# Patient Record
(demographics unavailable — no encounter records)

---

## 2024-10-19 NOTE — HISTORY OF PRESENT ILLNESS
[de-identified] : walk in- add on sibling visit [FreeTextEntry6] : Parent requests ear check- noticed wax in canal- wants to make sure no ear infection, no congestion or cough, no ST, no ear pain, no n/v/c/d, eating and drinking well, normal voiding. afebrile.

## 2024-10-19 NOTE — DISCUSSION/SUMMARY
[FreeTextEntry1] : D/W caregiver cerumen removal home care- benign ear exam today- advise debrox drops OTC for ear wax removal, avoid qtips; call if continued concerns. time spent: 20min

## 2024-12-03 NOTE — REVIEW OF SYSTEMS
[Fever] : fever [Fussy] : fussy [Nasal Congestion] : nasal congestion [Abdominal Pain] : abdominal pain [Negative] : Genitourinary [Nasal Discharge] : no nasal discharge [Sore Throat] : no sore throat [Cyanosis] : no cyanosis [Tachypnea] : not tachypneic [Wheezing] : no wheezing [Cough] : no cough [Vomiting] : no vomiting [Diarrhea] : no diarrhea

## 2024-12-03 NOTE — DISCUSSION/SUMMARY
[FreeTextEntry1] : Symptomatic treatment of fever and/or pain discussed Stat strep test ordered Throat culture, if POSITIVE, give Amoxicillin 300 mg BID x 10 days Covid test NOT done, deferred by parent Hydrate well Handwashing and infection control discussed Return to office if febrile > 48 hours or if symptoms get worse Go to ER if unable to come to the office or during after hours, parent encouraged to call service first before doing so. Recheck prn

## 2024-12-03 NOTE — HISTORY OF PRESENT ILLNESS
[de-identified] : vomiting, red spots in throat, fever, sibling has coxsackie  [FreeTextEntry6] : Fever x 1 day Stomach ache and vomiting yesterday, no vomiting today Sister diagnosed with herpangina yesterday Mom saw red spots in back of throat No cough or congestion In , exposed to strep throat in class

## 2024-12-03 NOTE — PHYSICAL EXAM
[Erythematous Oropharynx] : erythematous oropharynx [Palate petechiae] : palate petechiae [Enlarged] : enlarged [Submandibular] : submandibular [NL] : warm, clear [Conjuctival Injection] : no conjunctival injection [Discharge] : no discharge [Erythema] : no erythema [Bulging] : not bulging [Vesicles] : no vesicles [Exudate] : no exudate [Ulcerative Lesions] : no ulcerative lesions [Wheezing] : no wheezing [Rales] : no rales [Crackles] : no crackles [Tachypnea] : no tachypnea [Rhonchi] : no rhonchi

## 2025-03-08 NOTE — PHYSICAL EXAM
[Bleeding] : no bleeding [NL] : normotonic [+2 Patella DTR] : +2 patella DTR [FreeTextEntry2] : nontraumatic [FreeTextEntry5] : PERRL [FreeTextEntry4] : no swelling, no tenderness, no bruising

## 2025-03-08 NOTE — HISTORY OF PRESENT ILLNESS
[de-identified] : Hit nose on rail of bed last night. Per mom nose bled but stopped, no LOC.  [FreeTextEntry6] : Early this morning mom heard a loud noise and crying Went in to patient room and his nose was bleeding Patient told mom 2 stories - one was that he hit his head on the wall and the other was that he hit his nose on the railing.   Bleeding mostly from R nostril - stopped within 15 minutes.   No recurrence of bleeding Fell back asleep without a problem Slept well Seems normal behavior, playful No swelling of forehead or nose, no bruising Eating and drinking today normally No vomiting

## 2025-03-08 NOTE — DISCUSSION/SUMMARY
[FreeTextEntry1] : Reassured normal exam - no signs of nasal fracture or swelling or signs of head injury Monitor for lethargy, vomiting or recurrence of nosebleeds To ER for acute worsening of symptoms Recheck as needed if concerning symptoms develop

## 2025-06-09 NOTE — HISTORY OF PRESENT ILLNESS
[Mother] : mother [Normal] : Normal [Water heater temperature set at <120 degrees F] : Water heater temperature set at <120 degrees F [No] : No cigarette smoke exposure [Car seat in back seat] : Car seat in back seat [Smoke Detectors] : Smoke detectors [Supervised play near cars and streets] : Supervised play near cars and streets [Carbon Monoxide Detectors] : Carbon monoxide detectors [Yes] : Patient goes to dentist yearly [None] : Primary Fluoride Source: None [In nursery school] : In nursery school [Child given choices] : Child given choices [Child Cooperates] : Child cooperates [NO] : No [FreeTextEntry7] : 3 y/o [facility] [FreeTextEntry1] : mom doesnt want to give fluoride, pros and cons discussed Toilet training, mostly daytime

## 2025-06-09 NOTE — DISCUSSION/SUMMARY
[Normal Growth] : growth [Normal Development] : development [None] : No known medical problems [No Feeding Concerns] : feeding [No Elimination Concerns] : elimination [No Skin Concerns] : skin [Family Support] : family support [Normal Sleep Pattern] : sleep [Encouraging Literacy Activities] : encouraging literacy activities [Promoting Physical Activity] : promoting physical activity [Playing with Peers] : playing with peers [Safety] : safety [No Medications] : ~He/She~ is not on any medications [Parent/Guardian] : parent/guardian [FreeTextEntry1] : FAMILY SUPPORT: Discussed family support- family decisions, sibling rivalry, work balance.  LITERACY ACTIVITIES: Discussed encouraging literacy activities - singing, talking, describing,  PLAYING WITH PEERS: Discussed  interactive games, play opportunities.  PHYSICAL ACTIVITY: Discussed promoting physical activity (limits on physical activity).  DENTAL: Discussed visit with dentist.  TOILET TRAINING:  Child is toilet trained during the daytime for both bowel and bladder.   SAFETY: Discussed car safety seats, pedestrian safety, falls from windows, guns, poisons. Smoke    and carbon monoxide monitors stressed. Lead exposure discussed. Lead questionnaire reviewed, NO issues. 5-2-1-0 questionnaire reviewed and I discussed components of 5-2-1-0 healthy living with patient and family.   Recommended 5 servings of fruits and vegetables a day, less than 2 hours of screen time per day, 1 hour of exercise per day and zero sugar sweetened beverages.  Parent(s) have no issues or concerns with weight Discussed in the preferred language of English

## 2025-06-09 NOTE — PHYSICAL EXAM
[Alert] : alert [No Acute Distress] : no acute distress [Playful] : playful [Normocephalic] : normocephalic [PERRL] : PERRL [Conjunctivae with no discharge] : conjunctivae with no discharge [EOMI Bilateral] : EOMI bilateral [Auricles Well Formed] : auricles well formed [Clear Tympanic membranes with present light reflex and bony landmarks] : clear tympanic membranes with present light reflex and bony landmarks [No Discharge] : no discharge [Nares Patent] : nares patent [Pink Nasal Mucosa] : pink nasal mucosa [Palate Intact] : palate intact [Nonerythematous Oropharynx] : nonerythematous oropharynx [Uvula Midline] : uvula midline [No Caries] : no caries [Trachea Midline] : trachea midline [Supple, full passive range of motion] : supple, full passive range of motion [No Palpable Masses] : no palpable masses [Symmetric Chest Rise] : symmetric chest rise [Clear to Auscultation Bilaterally] : clear to auscultation bilaterally [Regular Rate and Rhythm] : regular rate and rhythm [Normoactive Precordium] : normoactive precordium [Normal S1, S2 present] : normal S1, S2 present [No Murmurs] : no murmurs [+2 Femoral Pulses] : +2 femoral pulses [Soft] : soft [NonTender] : non tender [Non Distended] : non distended [Normoactive Bowel Sounds] : normoactive bowel sounds [No Hepatomegaly] : no hepatomegaly [No Splenomegaly] : no splenomegaly [Dave 1] : Dave 1 [Central Urethral Opening] : central urethral opening [Testicles Descended Bilaterally] : testicles descended bilaterally [Patent] : patent [Normally Placed] : normally placed [No Abnormal Lymph Nodes Palpated] : no abnormal lymph nodes palpated [Symmetric Buttocks Creases] : symmetric buttocks creases [Symmetric Hip Rotation] : symmetric hip rotation [No Gait Asymmetry] : no gait asymmetry [No pain or deformities with palpation of bone, muscles, joints] : no pain or deformities with palpation of bone, muscles, joints [Normal Muscle Tone] : normal muscle tone [No Spinal Dimple] : no spinal dimple [NoTuft of Hair] : no tuft of hair [Straight] : straight [+2 Patella DTR] : +2 patella DTR [Cranial Nerves Grossly Intact] : cranial nerves grossly intact [de-identified] : keratosis arms

## 2025-06-26 NOTE — DISCUSSION/SUMMARY
[FreeTextEntry1] : Recommend using mist from a humidifier. Allow the child to breathe cool air during the night by opening a window or door. Fever can be treated with an over-the-counter medication such as acetaminophen or ibuprofen. Coughing can be treated with warm, clear fluids to loosen mucus on the vocal cords. Warm water, apple juice, or lemonade is safe for children older than four months. Frozen juice popsicles also can be given. Keep the child's head elevated. If the child's stridor does not improve contact health care provider immediately.  refer to ENT for adenoid evaluation

## 2025-06-26 NOTE — HISTORY OF PRESENT ILLNESS
[de-identified] : cough x 1 week [FreeTextEntry6] : slightly croupy sounding today  normal demeanor afebrile mild congestion  mom also notes that he makes a snoring type breath holding sound when he is sleeping.